# Patient Record
Sex: FEMALE | Race: WHITE
[De-identification: names, ages, dates, MRNs, and addresses within clinical notes are randomized per-mention and may not be internally consistent; named-entity substitution may affect disease eponyms.]

---

## 2023-05-01 ENCOUNTER — HOSPITAL ENCOUNTER (OUTPATIENT)
Dept: HOSPITAL 95 - ORSCMMR | Age: 63
Discharge: HOME | End: 2023-05-01
Attending: INTERNAL MEDICINE
Payer: COMMERCIAL

## 2023-05-01 VITALS — DIASTOLIC BLOOD PRESSURE: 79 MMHG | SYSTOLIC BLOOD PRESSURE: 140 MMHG

## 2023-05-01 VITALS — DIASTOLIC BLOOD PRESSURE: 93 MMHG | SYSTOLIC BLOOD PRESSURE: 170 MMHG

## 2023-05-01 VITALS — DIASTOLIC BLOOD PRESSURE: 72 MMHG | SYSTOLIC BLOOD PRESSURE: 145 MMHG

## 2023-05-01 VITALS — DIASTOLIC BLOOD PRESSURE: 95 MMHG | SYSTOLIC BLOOD PRESSURE: 150 MMHG

## 2023-05-01 VITALS — DIASTOLIC BLOOD PRESSURE: 114 MMHG | SYSTOLIC BLOOD PRESSURE: 185 MMHG

## 2023-05-01 VITALS — SYSTOLIC BLOOD PRESSURE: 113 MMHG | DIASTOLIC BLOOD PRESSURE: 96 MMHG

## 2023-05-01 VITALS — DIASTOLIC BLOOD PRESSURE: 109 MMHG | SYSTOLIC BLOOD PRESSURE: 179 MMHG

## 2023-05-01 VITALS — BODY MASS INDEX: 24.28 KG/M2 | WEIGHT: 142.2 LBS | HEIGHT: 64 IN

## 2023-05-01 VITALS — SYSTOLIC BLOOD PRESSURE: 154 MMHG | DIASTOLIC BLOOD PRESSURE: 87 MMHG

## 2023-05-01 VITALS — SYSTOLIC BLOOD PRESSURE: 137 MMHG | DIASTOLIC BLOOD PRESSURE: 74 MMHG

## 2023-05-01 VITALS — DIASTOLIC BLOOD PRESSURE: 75 MMHG | SYSTOLIC BLOOD PRESSURE: 141 MMHG

## 2023-05-01 VITALS — DIASTOLIC BLOOD PRESSURE: 95 MMHG | SYSTOLIC BLOOD PRESSURE: 171 MMHG

## 2023-05-01 VITALS — SYSTOLIC BLOOD PRESSURE: 191 MMHG | DIASTOLIC BLOOD PRESSURE: 107 MMHG

## 2023-05-01 DIAGNOSIS — F17.210: ICD-10-CM

## 2023-05-01 DIAGNOSIS — R19.4: Primary | ICD-10-CM

## 2023-05-01 DIAGNOSIS — Z79.899: ICD-10-CM

## 2023-05-01 DIAGNOSIS — Z86.010: ICD-10-CM

## 2023-05-01 LAB
ALBUMIN SERPL BCP-MCNC: 4.7 G/DL (ref 3.4–5)
ANION GAP SERPL CALCULATED.4IONS-SCNC: 5 MMOL/L (ref 6–16)
BUN SERPL-MCNC: 9 MG/DL (ref 8–24)
CALCIUM SERPL-MCNC: 10 MG/DL (ref 8.5–10.1)
CHLORIDE SERPL-SCNC: 109 MMOL/L (ref 98–108)
CO2 SERPL-SCNC: 25 MMOL/L (ref 21–32)
CREAT SERPL-MCNC: 0.53 MG/DL (ref 0.4–1)
GLUCOSE SERPL-MCNC: 90 MG/DL (ref 70–99)
PHOSPHATE SERPL-MCNC: 2.4 MG/DL (ref 2.5–4.9)
POTASSIUM SERPL-SCNC: 3.6 MMOL/L (ref 3.5–5.5)
SODIUM SERPL-SCNC: 139 MMOL/L (ref 136–145)

## 2023-05-01 PROCEDURE — 0DJD8ZZ INSPECTION OF LOWER INTESTINAL TRACT, VIA NATURAL OR ARTIFICIAL OPENING ENDOSCOPIC: ICD-10-PCS | Performed by: INTERNAL MEDICINE

## 2023-05-01 NOTE — NUR
05/01/23 Eun Castaneda
HISTORY, CHART, MEDICATIONS AND ALLERGIES REVIEWED BEFORE START OF
PROCEDURE. PATIENT CONFIRMS NPO STATUS AND AGREES WITH SCHEDULED
PROCEDURE. 3-LEAD EKG REVIEWED WITH PHYSICIAN PRIOR TO START OF
PROCEDURE. MONITOR INTACT WITH CONTINUOUS PULSE OXIMETRY,CAPNOGRAPHY,
3-LEAD EKG, INTERMITTENT BP. SUPPLEMENTAL O2 TO BE TITRATED THROUGHOUT
PROCEDURE TO MAINTAIN O2 SATURATION ABOVE 90%. PATIENT DETERMINED TO
BE ASA APPROPRIATE FOR PROPOFOL SEDATION PRIOR TO START OF PROCEDURE
BY

## 2023-05-01 NOTE — NUR
History, Chart, Medications and Allergies reviewed before start of
procedure. Lungs clear T/O to Auscultation.
Pre-Op teaching done. Pt verbalizes understanding.